# Patient Record
Sex: FEMALE | Race: WHITE | URBAN - METROPOLITAN AREA
[De-identification: names, ages, dates, MRNs, and addresses within clinical notes are randomized per-mention and may not be internally consistent; named-entity substitution may affect disease eponyms.]

---

## 2021-08-09 ENCOUNTER — OFFICE VISIT (OUTPATIENT)
Dept: URBAN - METROPOLITAN AREA CLINIC 30 | Facility: CLINIC | Age: 76
End: 2021-08-09
Payer: MEDICARE

## 2021-08-09 DIAGNOSIS — Z96.1 PRESENCE OF INTRAOCULAR LENS: ICD-10-CM

## 2021-08-09 DIAGNOSIS — H43.393 OTHER VITREOUS OPACITIES, BILATERAL: ICD-10-CM

## 2021-08-09 PROCEDURE — 92134 CPTRZ OPH DX IMG PST SGM RTA: CPT | Performed by: OPTOMETRIST

## 2021-08-09 PROCEDURE — 92004 COMPRE OPH EXAM NEW PT 1/>: CPT | Performed by: OPTOMETRIST

## 2021-08-09 ASSESSMENT — INTRAOCULAR PRESSURE
OD: 11
OS: 13

## 2021-08-09 ASSESSMENT — KERATOMETRY
OS: 45.62
OD: 45.30

## 2021-08-09 ASSESSMENT — VISUAL ACUITY
OS: 20/20
OD: 20/25

## 2021-08-09 NOTE — IMPRESSION/PLAN
Impression: Tear film insufficiency of bilateral lacrimal glands: H04.123. Plan: Patient notes pressure feeling. Dryness on exam. Assured no signs of infection or inflammation,. Rec regular use of ATs BID-QID OU.

## 2021-08-09 NOTE — IMPRESSION/PLAN
Impression: Retinal cotton wool spots: H35.81. Plan: OS, inf arcade. Pt notes she is pre-diabetic. She last saw PCP in May of 2021, noting blood work was WNL. MAC OCT WNL OU. These are retinal infarcts and may be related to HTN. Must r/o diabetes and make sure has had CBC. Other possible causes: HIV, collagen vascular disease, giant cell arteritis, hypercoagulable state. Advised pt to discuss c PCP.

## 2021-08-09 NOTE — IMPRESSION/PLAN
Impression: Retinal hemorrhage, bilateral: H35.63. Plan: 1 heme in macula OD, 1 heme superior arcade OS. MAC OCT WNL OU.

## 2021-10-15 ENCOUNTER — OFFICE VISIT (OUTPATIENT)
Dept: URBAN - METROPOLITAN AREA CLINIC 30 | Facility: CLINIC | Age: 76
End: 2021-10-15
Payer: MEDICARE

## 2021-10-15 DIAGNOSIS — H35.63 RETINAL HEMORRHAGE, BILATERAL: Primary | ICD-10-CM

## 2021-10-15 DIAGNOSIS — H04.123 TEAR FILM INSUFFICIENCY OF BILATERAL LACRIMAL GLANDS: ICD-10-CM

## 2021-10-15 DIAGNOSIS — H35.81 RETINAL EDEMA: ICD-10-CM

## 2021-10-15 PROCEDURE — 92250 FUNDUS PHOTOGRAPHY W/I&R: CPT | Performed by: OPTOMETRIST

## 2021-10-15 PROCEDURE — 92134 CPTRZ OPH DX IMG PST SGM RTA: CPT | Performed by: OPTOMETRIST

## 2021-10-15 PROCEDURE — 99213 OFFICE O/P EST LOW 20 MIN: CPT | Performed by: OPTOMETRIST

## 2021-10-15 ASSESSMENT — INTRAOCULAR PRESSURE
OD: 16
OS: 16

## 2021-10-15 NOTE — IMPRESSION/PLAN
Impression: Retinal cotton wool spots: H35.81. Plan: Resolved, will see PCP and she gave our note to her PCP. Monitor BP, BG. Call if vision declines.

## 2021-10-15 NOTE — IMPRESSION/PLAN
Impression: Tear film insufficiency of bilateral lacrimal glands: H04.123. Plan: Patient still notes occasional  pressure feeling. Dryness on exam. Assured no signs of infection or inflammation. Using ATs TID usually, Refresh. Conunctivochalasis may contribute. Still dry, cont ATs, add maikel at night. Avoid fans, etc. Gave dry eye handout today. Call if NI, may need to consider rx drops/plugs.

## 2021-10-15 NOTE — IMPRESSION/PLAN
Impression: Retinal hemorrhage, bilateral: H35.63. Plan: Resolved. MAC OCT WNL OU. Fundus photos today.

## 2022-09-27 ENCOUNTER — OFFICE VISIT (OUTPATIENT)
Dept: URBAN - METROPOLITAN AREA CLINIC 30 | Facility: CLINIC | Age: 77
End: 2022-09-27
Payer: MEDICARE

## 2022-09-27 DIAGNOSIS — Z96.1 PRESENCE OF INTRAOCULAR LENS: ICD-10-CM

## 2022-09-27 DIAGNOSIS — H04.123 TEAR FILM INSUFFICIENCY OF BILATERAL LACRIMAL GLANDS: Primary | ICD-10-CM

## 2022-09-27 DIAGNOSIS — H43.813 VITREOUS DEGENERATION, BILATERAL: ICD-10-CM

## 2022-09-27 PROCEDURE — 92134 CPTRZ OPH DX IMG PST SGM RTA: CPT | Performed by: OPTOMETRIST

## 2022-09-27 PROCEDURE — 92014 COMPRE OPH EXAM EST PT 1/>: CPT | Performed by: OPTOMETRIST

## 2022-09-27 ASSESSMENT — VISUAL ACUITY
OS: 20/20
OD: 20/20

## 2022-09-27 ASSESSMENT — KERATOMETRY
OS: 45.74
OD: 45.32

## 2022-09-27 ASSESSMENT — INTRAOCULAR PRESSURE
OS: 15
OD: 13

## 2022-09-27 NOTE — IMPRESSION/PLAN
Impression: Tear film insufficiency of bilateral lacrimal glands: H04.123. Conjunctivochalasis may contribute. Plan: Using ATs BID OU- Systane noting she still feels dry. Never tried maikel qhs or sleep mask. Will try maikel. Rarely gets pains OD, improved compared to last year. Avoid fans.

## 2023-08-11 ENCOUNTER — OFFICE VISIT (OUTPATIENT)
Dept: URBAN - METROPOLITAN AREA CLINIC 30 | Facility: CLINIC | Age: 78
End: 2023-08-11
Payer: MEDICARE

## 2023-08-11 DIAGNOSIS — H43.813 VITREOUS DEGENERATION, BILATERAL: Primary | ICD-10-CM

## 2023-08-11 DIAGNOSIS — H04.123 TEAR FILM INSUFFICIENCY OF BILATERAL LACRIMAL GLANDS: ICD-10-CM

## 2023-08-11 DIAGNOSIS — H35.63 RETINAL HEMORRHAGE, BILATERAL: ICD-10-CM

## 2023-08-11 DIAGNOSIS — H26.492 OTHER SECONDARY CATARACT, LEFT EYE: ICD-10-CM

## 2023-08-11 DIAGNOSIS — H35.61 RETINAL HEMORRHAGE, RIGHT EYE: ICD-10-CM

## 2023-08-11 PROCEDURE — 92014 COMPRE OPH EXAM EST PT 1/>: CPT | Performed by: OPTOMETRIST

## 2023-08-11 PROCEDURE — 92134 CPTRZ OPH DX IMG PST SGM RTA: CPT | Performed by: OPTOMETRIST

## 2023-08-11 ASSESSMENT — KERATOMETRY
OD: 45.49
OS: 45.44

## 2023-08-11 ASSESSMENT — INTRAOCULAR PRESSURE
OS: 15
OD: 15

## 2023-08-11 ASSESSMENT — VISUAL ACUITY
OD: 20/25
OS: 20/30

## 2024-02-12 ENCOUNTER — OFFICE VISIT (OUTPATIENT)
Dept: URBAN - METROPOLITAN AREA CLINIC 30 | Facility: CLINIC | Age: 79
End: 2024-02-12
Payer: MEDICARE

## 2024-02-12 DIAGNOSIS — H52.4 PRESBYOPIA: ICD-10-CM

## 2024-02-12 DIAGNOSIS — H35.61 RETINAL HEMORRHAGE, RIGHT EYE: Primary | ICD-10-CM

## 2024-02-12 DIAGNOSIS — H04.123 TEAR FILM INSUFFICIENCY OF BILATERAL LACRIMAL GLANDS: ICD-10-CM

## 2024-02-12 DIAGNOSIS — H26.492 OTHER SECONDARY CATARACT, LEFT EYE: ICD-10-CM

## 2024-02-12 PROCEDURE — 92014 COMPRE OPH EXAM EST PT 1/>: CPT | Performed by: OPTOMETRIST

## 2024-02-12 ASSESSMENT — VISUAL ACUITY
OD: 20/25
OS: 20/30

## 2024-02-12 ASSESSMENT — INTRAOCULAR PRESSURE
OS: 15
OD: 15

## 2024-02-12 ASSESSMENT — KERATOMETRY
OD: 45.40
OS: 45.47

## 2025-02-06 ENCOUNTER — OFFICE VISIT (OUTPATIENT)
Dept: URBAN - METROPOLITAN AREA CLINIC 30 | Facility: CLINIC | Age: 80
End: 2025-02-06
Payer: MEDICARE

## 2025-02-06 DIAGNOSIS — H04.123 TEAR FILM INSUFFICIENCY OF BILATERAL LACRIMAL GLANDS: Primary | ICD-10-CM

## 2025-02-06 DIAGNOSIS — H26.493 OTHER SECONDARY CATARACT, BILATERAL: ICD-10-CM

## 2025-02-06 DIAGNOSIS — H43.813 VITREOUS DEGENERATION, BILATERAL: ICD-10-CM

## 2025-02-06 PROCEDURE — 92014 COMPRE OPH EXAM EST PT 1/>: CPT | Performed by: OPTOMETRIST

## 2025-02-06 PROCEDURE — 92134 CPTRZ OPH DX IMG PST SGM RTA: CPT | Performed by: OPTOMETRIST

## 2025-02-06 ASSESSMENT — INTRAOCULAR PRESSURE
OS: 16
OD: 16

## 2025-02-06 ASSESSMENT — KERATOMETRY
OS: 45.75
OD: 46.13

## 2025-02-06 ASSESSMENT — VISUAL ACUITY
OD: 20/30
OS: 20/30